# Patient Record
Sex: FEMALE | Race: WHITE | URBAN - METROPOLITAN AREA
[De-identification: names, ages, dates, MRNs, and addresses within clinical notes are randomized per-mention and may not be internally consistent; named-entity substitution may affect disease eponyms.]

---

## 2023-03-23 ENCOUNTER — TELEPHONE (OUTPATIENT)
Dept: SCHEDULING | Facility: CLINIC | Age: 53
End: 2023-03-23
Payer: COMMERCIAL

## 2023-03-23 NOTE — TELEPHONE ENCOUNTER
New Patient Appointment Request    Name of caller: Gina    Reason for Visit: Family history of heart disease    Insurance: Personal Choice    Recent Cardiac Test/Procedures: No    Referred by: PCP    Previous Cardiologist name and phone number: N/A    Best contact number: [phone]    Additional notes/History: Pt was scheduled 6/15

## 2023-06-08 PROBLEM — Z90.5 H/O LEFT NEPHRECTOMY: Status: ACTIVE | Noted: 2023-06-08

## 2023-06-08 PROBLEM — K44.9 PARAESOPHAGEAL HERNIA: Status: ACTIVE | Noted: 2023-06-08

## 2023-06-08 PROBLEM — Z78.9 NO FAMILY HISTORY OF CARDIAC DISEASE: Status: ACTIVE | Noted: 2023-06-08

## 2023-06-15 ENCOUNTER — TELEPHONE (OUTPATIENT)
Dept: SCHEDULING | Facility: CLINIC | Age: 53
End: 2023-06-15
Payer: COMMERCIAL

## 2023-06-15 ENCOUNTER — OFFICE VISIT (OUTPATIENT)
Dept: CARDIOLOGY | Facility: CLINIC | Age: 53
End: 2023-06-15
Payer: COMMERCIAL

## 2023-06-15 VITALS
WEIGHT: 163 LBS | DIASTOLIC BLOOD PRESSURE: 72 MMHG | HEIGHT: 61 IN | HEART RATE: 77 BPM | RESPIRATION RATE: 18 BRPM | OXYGEN SATURATION: 99 % | SYSTOLIC BLOOD PRESSURE: 100 MMHG | BODY MASS INDEX: 30.78 KG/M2

## 2023-06-15 DIAGNOSIS — Z90.5 H/O LEFT NEPHRECTOMY: ICD-10-CM

## 2023-06-15 DIAGNOSIS — Z82.49 FAMILY HISTORY OF EARLY CAD: Primary | ICD-10-CM

## 2023-06-15 DIAGNOSIS — K44.9 PARAESOPHAGEAL HERNIA: ICD-10-CM

## 2023-06-15 PROCEDURE — 3008F BODY MASS INDEX DOCD: CPT | Performed by: INTERNAL MEDICINE

## 2023-06-15 PROCEDURE — 99204 OFFICE O/P NEW MOD 45 MIN: CPT | Performed by: INTERNAL MEDICINE

## 2023-06-15 PROCEDURE — 93000 ELECTROCARDIOGRAM COMPLETE: CPT | Performed by: INTERNAL MEDICINE

## 2023-06-15 NOTE — ASSESSMENT & PLAN NOTE
Maternal grandmother  approximately age 50 she herself has no chest pain shortness of breath she has extensive abdominal surgery  with no cardiac events her EKG is normal.  Cardiac screening with calcium score rest echocardiogram she wants a low blood pressure occasional lightheadedness conservative therapy with hydration her baseline LDL cholesterol is normal at 88 she does not smoke no other significant risk factors

## 2023-06-15 NOTE — PROGRESS NOTES
Cardiology Consult/New Patient    Marsha Hairston MD          Gina Mason is a 52 y.o. female identifies as who presents with   She is here for cardiac evaluation family history of premature cardiac disease with maternal grandmother dying at age 50  She does not smoke at her baseline cholesterol is at goal for primary prevention she had extensive surgery in 2020 for severe endometriosis and ended up with left nephrectomy  She is due for elective  incisiona hernia repair coming up  She had no cardiac issues with her extensive abdominal surgery  Her blood pressure runs low she gets occasional lightheadedness no syncope  Her cholesterol is normal and she has never smoked      Patient Active Problem List    Diagnosis Date Noted   • Family history of early CAD 06/08/2023   • Paraesophageal hernia 06/08/2023   • H/O left nephrectomy 06/08/2023       Medical History:   Past Medical History:   Diagnosis Date   • Hernia, hiatal        Surgical History:   Past Surgical History:   Procedure Laterality Date   • HYSTERECTOMY     • KIDNEY SURGERY Left     removed       Allergies: Patient has no known allergies.    No current outpatient medications on file.     No current facility-administered medications for this visit.       Social History:   Social History     Socioeconomic History   • Marital status: Single     Spouse name: None   • Number of children: None   • Years of education: None   • Highest education level: None   Tobacco Use   • Smoking status: Never   • Smokeless tobacco: Never   Vaping Use   • Vaping Use: Never used   Substance and Sexual Activity   • Alcohol use: Not Currently   • Drug use: Never   • Sexual activity: Defer       Family History:   Family History   Problem Relation Age of Onset   • Alzheimer's disease Biological Mother    • Hypertension Biological Father    • Heart attack Maternal Grandmother    • Heart attack Paternal Grandfather        Review of Systems   ROS    Objective       Vitals:     06/15/23 0847   BP: 100/72   Pulse: 77   Resp: 18   SpO2: 99%       Physical Exam  Constitutional:       General: She is not in acute distress.     Appearance: She is well-developed.   HENT:      Head: Normocephalic and atraumatic.      Nose: Nose normal.   Eyes:      General: No scleral icterus.     Conjunctiva/sclera: Conjunctivae normal.   Neck:      Vascular: No JVD.   Cardiovascular:      Rate and Rhythm: Normal rate and regular rhythm.      Pulses: Intact distal pulses.      Heart sounds: Normal heart sounds. No murmur heard.     No friction rub. No gallop.   Pulmonary:      Effort: Pulmonary effort is normal. No respiratory distress.      Breath sounds: No stridor. No wheezing or rales.   Chest:      Chest wall: No tenderness.   Abdominal:      Tenderness: There is no abdominal tenderness.   Musculoskeletal:         General: No deformity.   Skin:     General: Skin is warm and dry.   Neurological:      Mental Status: She is alert and oriented to person, place, and time.          Labs   Lab Results   Component Value Date    HGBA1C 5.4 09/20/2022       Imaging    ECHO    June 2O23  Left Ventricle: Normal ventricle size. Normal wall thickness. Estimated EF 65%. Wall motion appears grossly normal. Normal diastolic filling pattern for age.  •  Right Ventricle: Normal ventricle size. Normal systolic function.  •  Left Atrium: Mildly dilated atrium. Left atrial volume index (JAVIER A2C) is 34.25 cm3/m2.  •  Right Atrium: Mildly dilated atrium. Hypermobile atrial septal tissue.  •  Aortic Valve: Tricuspid valve. Trace regurgitation. No stenosis.  •  Aorta: Aortic root sclerotic. Sinuses of Valsalva sclerotic. Ascending aorta grossly normal. Aortic arch grossly normal. Aortic arch demonstrates a normal doppler pattern.  •  Mitral Valve: Bileaflet thickening.  The posterior leaflet is slightly myxomatous, but there is no echocardiographic criteria for prolapse.  Mild chordal HAYDER. Mild regurgitation.  •  Tricuspid Valve:  Structure is grossly normal. Mild regurgitation. Estimated RVSP = 22 mmHg.  •  Pulmonic Valve: Grossly normal structure. Mild regurgitation.  •  IVC/SVC: Inferior vena cava is <2.1cm. Inferior vena cava collapses >50% during inspiration.  •  Pericardium: No evidence of pericardial effusion.      CAT    COR MAGDIEL SCORE 2023  COR MAGDIEL SCORE IS ZERO  REACTIVE PULMNODULES        ECG       Assessment/Plan     Family history of early CAD  Maternal grandmother  approximately age 50 she herself has no chest pain shortness of breath she has extensive abdominal surgery  with no cardiac events her EKG is normal.  Cardiac screening with calcium score rest echocardiogram she wants a low blood pressure occasional lightheadedness conservative therapy with hydration her baseline LDL cholesterol is normal at 88 she does not smoke no other significant risk factors    H/O left nephrectomy   extensive surgery for endometriosis requiring left nephrectomy       She is here for cardiac assessment because of family history of premature cardiac disease with paternal grandmother  She underwent extensive abdominal surgery in  severe endometriosis requiring left nephrectomy no cardiac events  Her EKG is normal she has no cardiovascular complaints  We will do risk assessment with coronary calcium score resting echocardiogram    Addendum 2023     that she had left since her visit  Cardiac studies including a coronary calcium score of 0 makes obstructive CAD extremely unlikely  Echocardiogram performed, essentially normal mildly thickened mitral valve leaflets mild chordal prolapse mild mitral regurgitation  ejection fraction 65%    No cardiovascular contraindications to her undergoing abdominal hernia repair              This letter was generated using speech recognition software.  Please excuse any typographical errors.                Kenan Townsend MD  6/15/2023

## 2023-06-30 ENCOUNTER — HOSPITAL ENCOUNTER (OUTPATIENT)
Dept: CARDIOLOGY | Facility: CLINIC | Age: 53
Discharge: HOME | End: 2023-06-30
Attending: INTERNAL MEDICINE
Payer: COMMERCIAL

## 2023-06-30 VITALS
HEIGHT: 61 IN | BODY MASS INDEX: 29.45 KG/M2 | SYSTOLIC BLOOD PRESSURE: 92 MMHG | DIASTOLIC BLOOD PRESSURE: 62 MMHG | WEIGHT: 156 LBS

## 2023-06-30 DIAGNOSIS — K44.9 PARAESOPHAGEAL HERNIA: ICD-10-CM

## 2023-06-30 DIAGNOSIS — Z82.49 FAMILY HISTORY OF EARLY CAD: ICD-10-CM

## 2023-06-30 DIAGNOSIS — Z90.5 H/O LEFT NEPHRECTOMY: ICD-10-CM

## 2023-06-30 LAB
AORTIC ROOT ANNULUS: 2.8 CM
ASCENDING AORTA: 3.2 CM
AV PEAK GRADIENT: 6 MMHG
AV PEAK VELOCITY-S: 1.23 M/S
AV VALVE AREA: 2.02 CM2
BSA FOR ECHO PROCEDURE: 1.74 M2
E WAVE DECELERATION TIME: 310 MS
E/A RATIO: 1
E/E' RATIO: 7.5
E/LAT E' RATIO: 4.5
EDV (BP): 71.8 CM3
EF (A4C): 64.1 %
EF A2C: 66 %
EJECTION FRACTION: 65.2 %
EST RIGHT VENT SYSTOLIC PRESSURE BY TRICUSPID REGURGITATION JET: 22 MMHG
ESV (BP): 25 CM3
FRACTIONAL SHORTENING: 43.62 %
HEART RATE: 66 BPM
INTERVENTRICULAR SEPTUM: 0.9 CM
LA ESV (BP): 57.2 CM3
LA ESV INDEX (A2C): 34.25 CM3/M2
LA ESV INDEX (BP): 32.87 CM3/M2
LA/AORTA RATIO: 1.25
LAAS-AP2: 19.6 CM2
LAAS-AP4: 20 CM2
LAD 2D: 3.5 CM
LALD A4C: 5.27 CM
LALD A4C: 5.34 CM
LAV-S: 59.6 CM3
LEFT ATRIUM VOLUME INDEX: 31.26 CM3/M2
LEFT ATRIUM VOLUME: 54.4 CM3
LEFT INTERNAL DIMENSION IN SYSTOLE: 2.65 CM (ref 2.38–3.6)
LEFT VENTRICLE DIASTOLIC VOLUME INDEX: 41.55 CM3/M2
LEFT VENTRICLE DIASTOLIC VOLUME: 72.3 CM3
LEFT VENTRICLE SYSTOLIC VOLUME INDEX: 14.94 CM3/M2
LEFT VENTRICLE SYSTOLIC VOLUME: 26 CM3
LEFT VENTRICULAR INTERNAL DIMENSION IN DIASTOLE: 4.7 CM (ref 4.01–5.57)
LEFT VENTRICULAR POSTERIOR WALL IN END DIASTOLE: 1.03 CM (ref 0.52–0.97)
LV DIASTOLIC VOLUME: 68.9 CM3
LV ESV (APICAL 2 CHAMBER): 23.4 CM3
LVAD-AP2: 24 CM2
LVAD-AP4: 24.9 CM2
LVAS-AP2: 12.2 CM2
LVAS-AP4: 12.7 CM2
LVEDVI(A2C): 39.6 CM3/M2
LVEDVI(BP): 41.26 CM3/M2
LVESVI(A2C): 13.45 CM3/M2
LVESVI(BP): 14.37 CM3/M2
LVLD-AP2: 6.63 CM
LVLD-AP4: 6.91 CM
LVLS-AP2: 5.13 CM
LVLS-AP4: 5.42 CM
LVOT 2D: 2 CM
LVOT A: 3.14 CM2
LVOT PEAK VELOCITY: 1.01 M/S
LVOT PG: 4 MMHG
MLH CV ECHO AVA INDEX VELOCITY RATIO: 1.2
MV E'TISSUE VEL-LAT: 0.12 M/S
MV E'TISSUE VEL-MED: 0.07 M/S
MV PEAK A VEL: 0.53 M/S
MV PEAK E VEL: 0.54 M/S
POSTERIOR WALL: 0.9 CM
PULMONARY REGURGITATION LATE DIASTOLIC VELOCITY: 0.92 M/S
PV PEAK GRADIENT: 3 MMHG
PV PV: 0.81 M/S
RAP: 5 MMHG
RVOT VMAX: 0.75 M/S
RVOT VTI: 15.1 CM
SEPTAL TISSUE DOPPLER FREE WALL LATE DIA VELOCITY (APICAL 4 CHAMBER VIEW): 0.13 M/S
TR MAX PG: 17.47 MMHG
TRICUSPID VALVE PEAK REGURGITATION VELOCITY: 2.09 M/S
Z-SCORE OF LEFT VENTRICULAR DIMENSION IN END DIASTOLE: -0.06
Z-SCORE OF LEFT VENTRICULAR DIMENSION IN END SYSTOLE: -0.79
Z-SCORE OF LEFT VENTRICULAR POSTERIOR WALL IN END DIASTOLE: 1.91

## 2023-06-30 PROCEDURE — 93306 TTE W/DOPPLER COMPLETE: CPT | Performed by: INTERNAL MEDICINE

## 2023-07-09 ENCOUNTER — TELEPHONE (OUTPATIENT)
Dept: CARDIOLOGY | Facility: CLINIC | Age: 53
End: 2023-07-09
Payer: COMMERCIAL

## 2023-07-09 NOTE — TELEPHONE ENCOUNTER
Can you let her know her coronary calcium score was 0  There was some subcentimeter pulmonary nodules noted on the coronary calcium score  she should follow-up with her family doctor to see if repeat imaging required in 6 months  Her echocardiogram shows some mild  minimal valvular disease  No significant pathology

## 2023-07-10 NOTE — TELEPHONE ENCOUNTER
LMOM.  Good morning Gina.  This is Opal from Dr. Townsend's office.   Dr. Townsend reviewed your coronary calcium score and was zero.  No plaque or disease.  It did show small pulmonary nodule.  This will need to be follow up with your PCP to see if repeat imaging is required.  Your echocardiogram shows mild minimal valvular disease with no significant pathology.  If you have any questions, please call [phone].  Have a great day.

## 2023-11-15 ENCOUNTER — APPOINTMENT (RX ONLY)
Dept: URBAN - METROPOLITAN AREA CLINIC 374 | Facility: CLINIC | Age: 53
Setting detail: DERMATOLOGY
End: 2023-11-15

## 2023-11-15 DIAGNOSIS — L82.1 OTHER SEBORRHEIC KERATOSIS: ICD-10-CM

## 2023-11-15 DIAGNOSIS — Z71.89 OTHER SPECIFIED COUNSELING: ICD-10-CM

## 2023-11-15 DIAGNOSIS — D18.0 HEMANGIOMA: ICD-10-CM

## 2023-11-15 DIAGNOSIS — D22 MELANOCYTIC NEVI: ICD-10-CM

## 2023-11-15 PROBLEM — D22.5 MELANOCYTIC NEVI OF TRUNK: Status: ACTIVE | Noted: 2023-11-15

## 2023-11-15 PROBLEM — D18.01 HEMANGIOMA OF SKIN AND SUBCUTANEOUS TISSUE: Status: ACTIVE | Noted: 2023-11-15

## 2023-11-15 PROCEDURE — ? COUNSELING

## 2023-11-15 PROCEDURE — ? FULL BODY SKIN EXAM

## 2023-11-15 PROCEDURE — 11102 TANGNTL BX SKIN SINGLE LES: CPT

## 2023-11-15 PROCEDURE — ? TREATMENT REGIMEN

## 2023-11-15 PROCEDURE — ? BIOPSY BY SHAVE METHOD

## 2023-11-15 PROCEDURE — 99203 OFFICE O/P NEW LOW 30 MIN: CPT | Mod: 25

## 2023-11-15 ASSESSMENT — LOCATION SIMPLE DESCRIPTION DERM
LOCATION SIMPLE: RIGHT UPPER BACK
LOCATION SIMPLE: LEFT THIGH
LOCATION SIMPLE: UPPER BACK
LOCATION SIMPLE: RIGHT FOREHEAD
LOCATION SIMPLE: ABDOMEN

## 2023-11-15 ASSESSMENT — LOCATION ZONE DERM
LOCATION ZONE: LEG
LOCATION ZONE: FACE
LOCATION ZONE: TRUNK

## 2023-11-15 ASSESSMENT — LOCATION DETAILED DESCRIPTION DERM
LOCATION DETAILED: RIGHT INFERIOR MEDIAL UPPER BACK
LOCATION DETAILED: RIGHT MEDIAL FOREHEAD
LOCATION DETAILED: LEFT ANTERIOR DISTAL THIGH
LOCATION DETAILED: INFERIOR THORACIC SPINE
LOCATION DETAILED: SUPERIOR THORACIC SPINE
LOCATION DETAILED: EPIGASTRIC SKIN

## 2023-11-15 NOTE — PROCEDURE: BIOPSY BY SHAVE METHOD
Detail Level: Detailed
Depth Of Biopsy: dermis
Was A Bandage Applied: Yes
Size Of Lesion In Cm: 0.3
X Size Of Lesion In Cm: 0.4
Biopsy Type: H and E
Biopsy Method: Dermablade
Anesthesia Type: 1% lidocaine with epinephrine
Anesthesia Volume In Cc: 0.2
Additional Anesthesia Volume In Cc (Will Not Render If 0): 0
Hemostasis: Drysol
Wound Care: Petrolatum
Dressing: bandage
Destruction After The Procedure: No
Type Of Destruction Used: Curettage
Curettage Text: The wound bed was treated with curettage after the biopsy was performed.
Cryotherapy Text: The wound bed was treated with cryotherapy after the biopsy was performed.
Electrodesiccation Text: The wound bed was treated with electrodesiccation after the biopsy was performed.
Electrodesiccation And Curettage Text: The wound bed was treated with electrodesiccation and curettage after the biopsy was performed.
Silver Nitrate Text: The wound bed was treated with silver nitrate after the biopsy was performed.
Lab: 6
Consent: Written consent was obtained and risks were reviewed including but not limited to scarring, infection, bleeding, scabbing, incomplete removal, nerve damage and allergy to anesthesia.
Post-Care Instructions: I reviewed with the patient in detail post-care instructions. Patient is to keep the biopsy site dry overnight, and then apply bacitracin twice daily until healed. Patient may apply hydrogen peroxide soaks to remove any crusting.
Notification Instructions: Patient will be notified of biopsy results. However, patient instructed to call the office if not contacted within 2 weeks.
Billing Type: Third-Party Bill
Information: Selecting Yes will display possible errors in your note based on the variables you have selected. This validation is only offered as a suggestion for you. PLEASE NOTE THAT THE VALIDATION TEXT WILL BE REMOVED WHEN YOU FINALIZE YOUR NOTE. IF YOU WANT TO FAX A PRELIMINARY NOTE YOU WILL NEED TO TOGGLE THIS TO 'NO' IF YOU DO NOT WANT IT IN YOUR FAXED NOTE.